# Patient Record
Sex: MALE
[De-identification: names, ages, dates, MRNs, and addresses within clinical notes are randomized per-mention and may not be internally consistent; named-entity substitution may affect disease eponyms.]

---

## 2018-12-07 ENCOUNTER — APPOINTMENT (OUTPATIENT)
Dept: ORTHOPEDIC SURGERY | Facility: CLINIC | Age: 28
End: 2018-12-07

## 2018-12-07 PROBLEM — Z00.00 ENCOUNTER FOR PREVENTIVE HEALTH EXAMINATION: Status: ACTIVE | Noted: 2018-12-07

## 2022-08-06 ENCOUNTER — NON-APPOINTMENT (OUTPATIENT)
Age: 32
End: 2022-08-06

## 2022-10-03 ENCOUNTER — EMERGENCY (EMERGENCY)
Facility: HOSPITAL | Age: 32
LOS: 1 days | Discharge: HOME | End: 2022-10-03
Attending: EMERGENCY MEDICINE | Admitting: EMERGENCY MEDICINE

## 2022-10-03 VITALS
SYSTOLIC BLOOD PRESSURE: 122 MMHG | DIASTOLIC BLOOD PRESSURE: 67 MMHG | HEART RATE: 58 BPM | OXYGEN SATURATION: 100 % | RESPIRATION RATE: 18 BRPM | TEMPERATURE: 98 F

## 2022-10-03 VITALS
DIASTOLIC BLOOD PRESSURE: 83 MMHG | TEMPERATURE: 98 F | OXYGEN SATURATION: 100 % | RESPIRATION RATE: 16 BRPM | HEART RATE: 66 BPM | SYSTOLIC BLOOD PRESSURE: 149 MMHG

## 2022-10-03 LAB
ALBUMIN SERPL ELPH-MCNC: 4.9 G/DL — SIGNIFICANT CHANGE UP (ref 3.3–5)
ALP SERPL-CCNC: 134 U/L — HIGH (ref 40–120)
ALT FLD-CCNC: 16 U/L — SIGNIFICANT CHANGE UP (ref 4–41)
ANION GAP SERPL CALC-SCNC: 12 MMOL/L — SIGNIFICANT CHANGE UP (ref 7–14)
APPEARANCE UR: CLEAR — SIGNIFICANT CHANGE UP
AST SERPL-CCNC: 18 U/L — SIGNIFICANT CHANGE UP (ref 4–40)
BASOPHILS # BLD AUTO: 0.04 K/UL — SIGNIFICANT CHANGE UP (ref 0–0.2)
BASOPHILS NFR BLD AUTO: 0.4 % — SIGNIFICANT CHANGE UP (ref 0–2)
BILIRUB SERPL-MCNC: 0.3 MG/DL — SIGNIFICANT CHANGE UP (ref 0.2–1.2)
BILIRUB UR-MCNC: NEGATIVE — SIGNIFICANT CHANGE UP
BUN SERPL-MCNC: 11 MG/DL — SIGNIFICANT CHANGE UP (ref 7–23)
CALCIUM SERPL-MCNC: 9.3 MG/DL — SIGNIFICANT CHANGE UP (ref 8.4–10.5)
CHLORIDE SERPL-SCNC: 105 MMOL/L — SIGNIFICANT CHANGE UP (ref 98–107)
CO2 SERPL-SCNC: 25 MMOL/L — SIGNIFICANT CHANGE UP (ref 22–31)
COLOR SPEC: YELLOW — SIGNIFICANT CHANGE UP
CREAT SERPL-MCNC: 0.86 MG/DL — SIGNIFICANT CHANGE UP (ref 0.5–1.3)
DIFF PNL FLD: NEGATIVE — SIGNIFICANT CHANGE UP
EGFR: 119 ML/MIN/1.73M2 — SIGNIFICANT CHANGE UP
EOSINOPHIL # BLD AUTO: 0.2 K/UL — SIGNIFICANT CHANGE UP (ref 0–0.5)
EOSINOPHIL NFR BLD AUTO: 2.1 % — SIGNIFICANT CHANGE UP (ref 0–6)
FOLATE SERPL-MCNC: 12.2 NG/ML — SIGNIFICANT CHANGE UP (ref 3.1–17.5)
GLUCOSE SERPL-MCNC: 79 MG/DL — SIGNIFICANT CHANGE UP (ref 70–99)
GLUCOSE UR QL: NEGATIVE — SIGNIFICANT CHANGE UP
HCT VFR BLD CALC: 45.4 % — SIGNIFICANT CHANGE UP (ref 39–50)
HGB BLD-MCNC: 15.3 G/DL — SIGNIFICANT CHANGE UP (ref 13–17)
IANC: 4.96 K/UL — SIGNIFICANT CHANGE UP (ref 1.8–7.4)
IMM GRANULOCYTES NFR BLD AUTO: 0.5 % — SIGNIFICANT CHANGE UP (ref 0–0.9)
KETONES UR-MCNC: NEGATIVE — SIGNIFICANT CHANGE UP
LEUKOCYTE ESTERASE UR-ACNC: NEGATIVE — SIGNIFICANT CHANGE UP
LYMPHOCYTES # BLD AUTO: 3.46 K/UL — HIGH (ref 1–3.3)
LYMPHOCYTES # BLD AUTO: 36 % — SIGNIFICANT CHANGE UP (ref 13–44)
MCHC RBC-ENTMCNC: 29.6 PG — SIGNIFICANT CHANGE UP (ref 27–34)
MCHC RBC-ENTMCNC: 33.7 GM/DL — SIGNIFICANT CHANGE UP (ref 32–36)
MCV RBC AUTO: 87.8 FL — SIGNIFICANT CHANGE UP (ref 80–100)
MONOCYTES # BLD AUTO: 0.89 K/UL — SIGNIFICANT CHANGE UP (ref 0–0.9)
MONOCYTES NFR BLD AUTO: 9.3 % — SIGNIFICANT CHANGE UP (ref 2–14)
NEUTROPHILS # BLD AUTO: 4.96 K/UL — SIGNIFICANT CHANGE UP (ref 1.8–7.4)
NEUTROPHILS NFR BLD AUTO: 51.7 % — SIGNIFICANT CHANGE UP (ref 43–77)
NITRITE UR-MCNC: NEGATIVE — SIGNIFICANT CHANGE UP
NRBC # BLD: 0 /100 WBCS — SIGNIFICANT CHANGE UP (ref 0–0)
NRBC # FLD: 0 K/UL — SIGNIFICANT CHANGE UP (ref 0–0)
PH UR: 6 — SIGNIFICANT CHANGE UP (ref 5–8)
PLATELET # BLD AUTO: 256 K/UL — SIGNIFICANT CHANGE UP (ref 150–400)
POTASSIUM SERPL-MCNC: 3.7 MMOL/L — SIGNIFICANT CHANGE UP (ref 3.5–5.3)
POTASSIUM SERPL-SCNC: 3.7 MMOL/L — SIGNIFICANT CHANGE UP (ref 3.5–5.3)
PROT SERPL-MCNC: 7.9 G/DL — SIGNIFICANT CHANGE UP (ref 6–8.3)
PROT UR-MCNC: ABNORMAL
RBC # BLD: 5.17 M/UL — SIGNIFICANT CHANGE UP (ref 4.2–5.8)
RBC # FLD: 12.2 % — SIGNIFICANT CHANGE UP (ref 10.3–14.5)
SODIUM SERPL-SCNC: 142 MMOL/L — SIGNIFICANT CHANGE UP (ref 135–145)
SP GR SPEC: 1.03 — SIGNIFICANT CHANGE UP (ref 1.01–1.05)
UROBILINOGEN FLD QL: SIGNIFICANT CHANGE UP
VIT B12 SERPL-MCNC: 379 PG/ML — SIGNIFICANT CHANGE UP (ref 200–900)
WBC # BLD: 9.6 K/UL — SIGNIFICANT CHANGE UP (ref 3.8–10.5)
WBC # FLD AUTO: 9.6 K/UL — SIGNIFICANT CHANGE UP (ref 3.8–10.5)

## 2022-10-03 PROCEDURE — 99284 EMERGENCY DEPT VISIT MOD MDM: CPT

## 2022-10-03 PROCEDURE — 93010 ELECTROCARDIOGRAM REPORT: CPT

## 2022-10-03 NOTE — ED ADULT NURSE NOTE - CAS EDP DISCH TYPE
Hospital Discharge Documentation  Please phone to schedule a hospital follow up appointment.     From: [address] [facility]  Phone: [phone]    Patient discharged time/date: 9/28/2018  6:55 PM  Patient discharge disposition:  Home or Self BP: 115/57   Pulse: 76   Resp: 20   Temp: 97.6 °F (36.4 °C)     General appearance: alert, appears stated age and cooperative  Pulmonary:  clear to auscultation bilaterally  Cardiovascular: S1, S2 normal, no murmur, click, rub or gallop, regular rate and r Abnormal liver function tests most likely related to alcoholic liver disease. Counseled on etoh cessation  Improved      Alcoholism with high risk of alcohol withdrawal.  CIWA low  Counseled on cessation     Normocytic anemia  Iron studies ok[RS. 2]    Con visit    [address]  [phone]     I answered all the patient's questions spending >30 minutes with patient in well over half time in face-to-face discussion of further evaluation and therapy. [RS.2]    Zina Henson.  Liat Herzog  9/28 Home

## 2022-10-03 NOTE — ED PROVIDER NOTE - NSICDXFAMILYHX_GEN_ALL_CORE_FT
FAMILY HISTORY:  Father  Still living? Unknown  FH: hypertension, Age at diagnosis: Age Unknown    Sibling  Still living? Unknown  FH: hypertension, Age at diagnosis: Age Unknown     FAMILY HISTORY:  Father  Still living? Unknown  Family history of stroke, Age at diagnosis: Age Unknown  FH: hypertension, Age at diagnosis: Age Unknown    Sibling  Still living? Unknown  Family history of stroke, Age at diagnosis: Age Unknown  FH: hypertension, Age at diagnosis: Age Unknown

## 2022-10-03 NOTE — ED PROVIDER NOTE - OBJECTIVE STATEMENT
32 year old man pmh of hypertension (diagnosed as teenager) who p/w three presyncopal episodes since yesterday morning. The first episode was during breakfast yesterday, then while driving in the afternoon, and then again today. None of the episodes were associated with exertion or specific triggers. The patient did not see stars or have his vision black-out, but states he felt like he was losing his balance. He may have started sweating during the episode and felt like he was "going to die" but is unsure if his heart was racing. The episodes lasted about a minute. He states he has been urinating more often recently and drinking more water. His father passed away from complications of hypertension, although he is not sure exactly how. 32 year old man pmh of hypertension (diagnosed as teenager) who p/w three presyncopal episodes since yesterday morning. The first episode was during breakfast yesterday, then while driving in the afternoon, and then again today. None of the episodes were associated with exertion or specific triggers. The patient did not see stars or have his vision black-out, but states he felt like he was losing his balance and falling to the side. He felt very hot during the episode although is unsure if he was sweating, stating he felt like he was "going to die" but is unsure if his heart was racing. His vision was blurry. The episodes lasted about a minute. He states he has been urinating more often recently and drinking more water. He states his father and brother  from strokes at the age of 60 and 13 respectively, and that both had hypertension. 32 year old man pmh of hypertension (diagnosed as teenager) who p/w three presyncopal episodes since yesterday morning. The first episode was during breakfast yesterday, then while driving in the afternoon, and then again today. None of the episodes were associated with exertion or specific triggers. The patient did not see stars or have his vision black-out, but states he felt like he was losing his balance and falling to the side. He felt very hot during the episode although is unsure if he was sweating, stating he felt like he was "going to die". His vision was blurry. The episodes lasted about a minute. He states he has been urinating more often recently and drinking more water. He states his father and brother  from strokes at the age of 60 and 13 respectively, and that both had hypertension.

## 2022-10-03 NOTE — ED PROVIDER NOTE - ATTENDING CONTRIBUTION TO CARE
31yo M with PMHX HTN and +fam h/o HTN and CVA in brother at age 13, p/w 3 ~1min long presyncopal episodes over last 1-2 days.  All episodes occurred while seated and associated with feeling sense of room movement and blurry vision and feeling hot although no diaphoresis. no headaches, chest pains, palpitations, n/v, fevers, trauma, focal weakness or numbness, or h/o prior similar episodes.  Currently without any symptoms and feeling well.    Of note pt does endorse urinary frequency and polydipsia    General: Patient alert in no apparent distress  Skin: Dry and intact  HEENT: Head atraumatic. Oral mucosa moist.   Eyes: Conjunctiva normal. EOMI, no nystagmus  Cardiac: Regular rhythm and rate. No pretibial edema b/l  Respiratory: Lungs clear b/l and symmetric. No respiratory distress. Able to speak in complete sentences.  Gastrointestinal: Abdomen soft, nondistended, nontender  Musculoskeletal: Moves all extremities spontaneously  Neurological: alert and oriented to person, place, and time. CN 2-12 grossly intact. No pronator drift. Motor strength 5/5 in all distal extremities. no dysmetria. nonataxic gait  Psychiatric: Calm and cooperative    EKG nsr with no acute ischemic abnormalities - QT <500, no brugada, WPW, or signs of HOCM on EKG    a/p  nonspecific presyncope  now baseline  ?vasovagal or peripheral vertigo  no signs of CVA clinically at this time  will get screening labs with lytes and renal function and ua given +fam h/o of CVD and CVA  will plan for outpt neuro f/u for further w/u

## 2022-10-03 NOTE — ED PROVIDER NOTE - NSFOLLOWUPINSTRUCTIONS_ED_ALL_ED_FT
You were seen in the Emergency Room for near-fainting episodes.   After our evaluation, we have determined you do not have a life-threatening condition today and you can be discharged home.    Please return to the Emergency Room if your symptoms change or worsen.    Please follow up with a neurologist specialist given your family history of strokes at a young age.  You will receive a call from the hospital to assist you in your follow up.

## 2022-10-03 NOTE — ED ADULT NURSE NOTE - NSICDXFAMILYHX_GEN_ALL_CORE_FT
FAMILY HISTORY:  Father  Still living? Unknown  Family history of stroke, Age at diagnosis: Age Unknown  FH: hypertension, Age at diagnosis: Age Unknown    Sibling  Still living? Unknown  Family history of stroke, Age at diagnosis: Age Unknown  FH: hypertension, Age at diagnosis: Age Unknown

## 2022-10-03 NOTE — ED ADULT NURSE NOTE - ISOLATION TYPE:
[facility]  Child and Adolescent Psychiatry  [address] [facility] [place] [address]  Phone: [phone]  Fax: [phone]  Follow Up Time: 1-3 days   [facility]  Child and Adolescent Psychiatry  [address] [facility] [place] [address]  Phone: [phone]  Fax: [phone]  Follow Up Time: 1-3 days    Berhane Vivas  PEDIATRICS  [address] [place] [place] [address]  Phone: [phone]  Fax: [phone]  Follow Up Time: 1-3 days   None

## 2022-10-03 NOTE — ED ADULT NURSE NOTE - CHIEF COMPLAINT QUOTE
Pt. c/o a few near-syncopal episodes since yesterday. States he felt dizzy and off-balance at the time. Denies cp, sob, palpitations, blurry vision.

## 2022-10-03 NOTE — ED PROVIDER NOTE - PATIENT PORTAL LINK FT
You can access the FollowMyHealth Patient Portal offered by [facility] by registering at the following website: [email or web address]. By joining [email or web address]’s FollowMyHealth portal, you will also be able to view your health information using other applications (apps) compatible with our system.

## 2022-10-03 NOTE — ED PROVIDER NOTE - CLINICAL SUMMARY MEDICAL DECISION MAKING FREE TEXT BOX
32 year old male pmh hypertension w/ apparent family hx of hypertensive stroke in father and brother who p/w three presyncopal episodes associated with disequilibrium. Not classic vasovagal episode although pt does report feeling hot. No emotional trigger, no positional or exertional trigger. Possibly describing vertigo. Exam notable for /83, neuro exam showing no focal deficits, no ataxia, no nystagmus. EKG showed normal sinus rhythm.  Blood glucose 78. Will get CBC, CMP, UA. If normal, can f/u outpatient with neurology with MRI.

## 2022-10-03 NOTE — ED PROVIDER NOTE - NEUROLOGICAL, MLM
Alert and oriented, CN II-XII intact. No nystagmus, no focal deficits, no ataxia. no motor or sensory deficits. Gait is intact.

## 2022-10-03 NOTE — ED PROVIDER NOTE - PROGRESS NOTE DETAILS
Dr. Robi Benavidez DO (ED ATTENDING):  all labs with no acute abnormalities. will give neuro f/u  All test results have been reviewed with the patient with verbalized understanding.  Opportunities to ask questions for further understanding have been offered to the patient.  The patient feels comfortable going home after our evaluation and understands to return to the Emergency Department for any new or worsening symptoms.

## 2022-10-03 NOTE — ED PROVIDER NOTE - NEURO NEGATIVE STATEMENT, MLM
+Presyncope/disequilibrium as per HPI. +Hx of headache. no loss of consciousness, no gait abnormality, no headache, no sensory deficits, and no weakness. +Presyncope/disequilibrium as per HPI. +Hx of headache. +Numbness in b/l arms for the last few months. no loss of consciousness, no gait abnormality, no headache, no sensory deficits, and no weakness.

## 2022-10-03 NOTE — ED PROVIDER NOTE - NSPTACCESSSVCSAPPTDETAILS_ED_ALL_ED_FT
Came to ER for presyncope (almost fainting). Has +family histeoy of brother dying at young age from stroke. May benefit from outpatient MRI imaging

## 2022-10-03 NOTE — ED ADULT NURSE NOTE - OBJECTIVE STATEMENT
Received pt in intake room 13 with dizziness since yesterday. No c/o nausea and vomiting . Denies chest pain or short of breath . Placed 20G IV to the left AC  and labs sent.

## 2022-10-04 LAB
CULTURE RESULTS: NO GROWTH — SIGNIFICANT CHANGE UP
SPECIMEN SOURCE: SIGNIFICANT CHANGE UP

## 2022-12-09 ENCOUNTER — EMERGENCY (EMERGENCY)
Facility: HOSPITAL | Age: 32
LOS: 1 days | Discharge: HOME | End: 2022-12-09
Admitting: EMERGENCY MEDICINE

## 2022-12-09 VITALS
OXYGEN SATURATION: 100 % | RESPIRATION RATE: 16 BRPM | DIASTOLIC BLOOD PRESSURE: 95 MMHG | SYSTOLIC BLOOD PRESSURE: 173 MMHG | HEART RATE: 85 BPM | TEMPERATURE: 98 F

## 2022-12-09 PROCEDURE — 99283 EMERGENCY DEPT VISIT LOW MDM: CPT

## 2022-12-09 RX ORDER — IBUPROFEN 200 MG
1 TABLET ORAL
Qty: 25 | Refills: 0
Start: 2022-12-09

## 2022-12-09 RX ORDER — IBUPROFEN 200 MG
600 TABLET ORAL ONCE
Refills: 0 | Status: COMPLETED | OUTPATIENT
Start: 2022-12-09 | End: 2022-12-09

## 2022-12-09 RX ADMIN — Medication 600 MILLIGRAM(S): at 19:31

## 2022-12-09 RX ADMIN — Medication 300 MILLIGRAM(S): at 19:31

## 2022-12-09 NOTE — ED PROVIDER NOTE - PATIENT PORTAL LINK FT
You can access the FollowMyHealth Patient Portal offered by [facility] by registering at the following website: [email or web address]. By joining [employer]’s FollowMyHealth portal, you will also be able to view your health information using other applications (apps) compatible with our system.

## 2022-12-09 NOTE — ED PROVIDER NOTE - MUSCULOSKELETAL, MLM
left knee: left knee 23cm x 2cm erythema with small central fluctuance, spontaneously draining. no joint tenderness. freely ranging joint. Spine appears normal, range of motion is not limited, no muscle or joint tenderness

## 2022-12-09 NOTE — ED ADULT TRIAGE NOTE - CHIEF COMPLAINT QUOTE
pt stating noticed "pimple" to left knee that grew in size x 4 days and tried to pop it but now has pain, ambulatory to triage, denies fever, chills, no pmhx

## 2022-12-09 NOTE — ED PROVIDER NOTE - OBJECTIVE STATEMENT
Patient is a 32-year-old male with no significant past medical history presenting with a complaint of tender bump discharge to his left knee x3 days.  He reports bumps started as a small pimple and he admits to attempting to pop a pimple with a needle.  After he begin experiencing swelling purulent discharge from bump after.  No associated fever, chills, nausea, vomiting loss of motor function.

## 2022-12-09 NOTE — ED PROVIDER NOTE - CLINICAL SUMMARY MEDICAL DECISION MAKING FREE TEXT BOX
32-year-old male presenting with small abscess to the left knee after attempting to pop pimple with needle.  Patient nontoxic-appearing, infection localized to the superficial aspect of knee, low concern for infected joint.  Patient will be treated with antibiotics, and encouraged a warm compress

## 2023-12-06 ENCOUNTER — OUTPATIENT (OUTPATIENT)
Dept: OUTPATIENT SERVICES | Facility: HOSPITAL | Age: 33
LOS: 1 days | End: 2023-12-06
Payer: SELF-PAY

## 2023-12-06 ENCOUNTER — APPOINTMENT (OUTPATIENT)
Dept: RADIOLOGY | Facility: IMAGING CENTER | Age: 33
End: 2023-12-06
Payer: SELF-PAY

## 2023-12-06 DIAGNOSIS — Z00.8 ENCOUNTER FOR OTHER GENERAL EXAMINATION: ICD-10-CM

## 2023-12-06 PROBLEM — Z87.898 PERSONAL HISTORY OF OTHER SPECIFIED CONDITIONS: Chronic | Status: ACTIVE | Noted: 2022-10-03

## 2023-12-06 PROBLEM — I10 ESSENTIAL (PRIMARY) HYPERTENSION: Chronic | Status: ACTIVE | Noted: 2022-10-03

## 2023-12-06 PROCEDURE — 73620 X-RAY EXAM OF FOOT: CPT | Mod: 26,50

## 2023-12-06 PROCEDURE — 73620 X-RAY EXAM OF FOOT: CPT

## 2024-04-02 ENCOUNTER — APPOINTMENT (OUTPATIENT)
Dept: RADIOLOGY | Facility: IMAGING CENTER | Age: 34
End: 2024-04-02

## 2025-03-29 NOTE — ED PROVIDER NOTE - CPE EDP MUSC NORM
PAMELLA Spencer notified Notified  the Provider order EKG and continuing to monitor ACP notifed. Provider at bedside. O2 switched to ears -Improve Saw at bedside continue to monitor Notify ACP, address patient's pain and anxiety with medication as needed. pt refusal of all other methods for an accurate read - will discontinue from  during the night but continuing to monitor normal...